# Patient Record
Sex: FEMALE | Race: BLACK OR AFRICAN AMERICAN | NOT HISPANIC OR LATINO | Employment: OTHER | URBAN - METROPOLITAN AREA
[De-identification: names, ages, dates, MRNs, and addresses within clinical notes are randomized per-mention and may not be internally consistent; named-entity substitution may affect disease eponyms.]

---

## 2022-02-08 ENCOUNTER — LAB (OUTPATIENT)
Dept: FAMILY MEDICINE | Facility: CLINIC | Age: 72
End: 2022-02-08

## 2022-02-08 DIAGNOSIS — Z20.822 SUSPECTED COVID-19 VIRUS INFECTION: ICD-10-CM

## 2022-02-08 PROCEDURE — U0005 INFEC AGEN DETEC AMPLI PROBE: HCPCS

## 2022-02-08 PROCEDURE — 99207 PR NO CHARGE LOS: CPT

## 2022-02-08 PROCEDURE — U0003 INFECTIOUS AGENT DETECTION BY NUCLEIC ACID (DNA OR RNA); SEVERE ACUTE RESPIRATORY SYNDROME CORONAVIRUS 2 (SARS-COV-2) (CORONAVIRUS DISEASE [COVID-19]), AMPLIFIED PROBE TECHNIQUE, MAKING USE OF HIGH THROUGHPUT TECHNOLOGIES AS DESCRIBED BY CMS-2020-01-R: HCPCS

## 2022-02-09 LAB — SARS-COV-2 RNA RESP QL NAA+PROBE: NEGATIVE

## 2022-02-10 ENCOUNTER — TELEPHONE (OUTPATIENT)
Dept: FAMILY MEDICINE | Facility: CLINIC | Age: 72
End: 2022-02-10

## 2022-02-10 NOTE — TELEPHONE ENCOUNTER

## 2023-06-02 ENCOUNTER — APPOINTMENT (OUTPATIENT)
Dept: CT IMAGING | Facility: CLINIC | Age: 73
End: 2023-06-02
Attending: EMERGENCY MEDICINE
Payer: COMMERCIAL

## 2023-06-02 ENCOUNTER — APPOINTMENT (OUTPATIENT)
Dept: MRI IMAGING | Facility: CLINIC | Age: 73
End: 2023-06-02
Attending: EMERGENCY MEDICINE
Payer: COMMERCIAL

## 2023-06-02 ENCOUNTER — HOSPITAL ENCOUNTER (EMERGENCY)
Facility: CLINIC | Age: 73
Discharge: HOME | End: 2023-06-02
Attending: EMERGENCY MEDICINE | Admitting: EMERGENCY MEDICINE
Payer: COMMERCIAL

## 2023-06-02 VITALS
BODY MASS INDEX: 26.21 KG/M2 | HEIGHT: 59 IN | RESPIRATION RATE: 12 BRPM | WEIGHT: 130 LBS | TEMPERATURE: 98.6 F | HEART RATE: 54 BPM | OXYGEN SATURATION: 96 % | SYSTOLIC BLOOD PRESSURE: 120 MMHG | DIASTOLIC BLOOD PRESSURE: 64 MMHG

## 2023-06-02 DIAGNOSIS — R42 LIGHTHEADEDNESS: ICD-10-CM

## 2023-06-02 DIAGNOSIS — R42 DIZZINESS: ICD-10-CM

## 2023-06-02 LAB
ANION GAP SERPL CALCULATED.3IONS-SCNC: 9 MMOL/L (ref 5–18)
APTT PPP: 26 SECONDS (ref 22–38)
ATRIAL RATE - MUSE: 80 BPM
BASOPHILS # BLD AUTO: 0 10E3/UL (ref 0–0.2)
BASOPHILS NFR BLD AUTO: 0 %
BUN SERPL-MCNC: 25 MG/DL (ref 8–28)
CALCIUM SERPL-MCNC: 9.9 MG/DL (ref 8.5–10.5)
CHLORIDE BLD-SCNC: 104 MMOL/L (ref 98–107)
CO2 SERPL-SCNC: 26 MMOL/L (ref 22–31)
CREAT SERPL-MCNC: 0.91 MG/DL (ref 0.6–1.1)
DIASTOLIC BLOOD PRESSURE - MUSE: 72 MMHG
EOSINOPHIL # BLD AUTO: 0.1 10E3/UL (ref 0–0.7)
EOSINOPHIL NFR BLD AUTO: 3 %
ERYTHROCYTE [DISTWIDTH] IN BLOOD BY AUTOMATED COUNT: 13.4 % (ref 10–15)
GFR SERPL CREATININE-BSD FRML MDRD: 65 ML/MIN/1.73M2
GLUCOSE BLD-MCNC: 167 MG/DL (ref 70–125)
GLUCOSE BLDC GLUCOMTR-MCNC: 183 MG/DL (ref 70–99)
HCT VFR BLD AUTO: 33 % (ref 35–47)
HGB BLD-MCNC: 11.1 G/DL (ref 11.7–15.7)
IMM GRANULOCYTES # BLD: 0 10E3/UL
IMM GRANULOCYTES NFR BLD: 0 %
INR PPP: 1.06 (ref 0.85–1.15)
INTERPRETATION ECG - MUSE: NORMAL
LYMPHOCYTES # BLD AUTO: 1.8 10E3/UL (ref 0.8–5.3)
LYMPHOCYTES NFR BLD AUTO: 35 %
MCH RBC QN AUTO: 26.2 PG (ref 26.5–33)
MCHC RBC AUTO-ENTMCNC: 33.6 G/DL (ref 31.5–36.5)
MCV RBC AUTO: 78 FL (ref 78–100)
MONOCYTES # BLD AUTO: 0.5 10E3/UL (ref 0–1.3)
MONOCYTES NFR BLD AUTO: 9 %
NEUTROPHILS # BLD AUTO: 2.7 10E3/UL (ref 1.6–8.3)
NEUTROPHILS NFR BLD AUTO: 53 %
NRBC # BLD AUTO: 0 10E3/UL
NRBC BLD AUTO-RTO: 0 /100
P AXIS - MUSE: 30 DEGREES
PLATELET # BLD AUTO: 210 10E3/UL (ref 150–450)
POTASSIUM BLD-SCNC: 3.6 MMOL/L (ref 3.5–5)
PR INTERVAL - MUSE: 188 MS
QRS DURATION - MUSE: 82 MS
QT - MUSE: 382 MS
QTC - MUSE: 440 MS
R AXIS - MUSE: -12 DEGREES
RBC # BLD AUTO: 4.24 10E6/UL (ref 3.8–5.2)
SODIUM SERPL-SCNC: 139 MMOL/L (ref 136–145)
SYSTOLIC BLOOD PRESSURE - MUSE: 153 MMHG
T AXIS - MUSE: 10 DEGREES
TROPONIN I SERPL-MCNC: <0.01 NG/ML (ref 0–0.29)
VENTRICULAR RATE- MUSE: 80 BPM
WBC # BLD AUTO: 5 10E3/UL (ref 4–11)

## 2023-06-02 PROCEDURE — 85025 COMPLETE CBC W/AUTO DIFF WBC: CPT | Performed by: EMERGENCY MEDICINE

## 2023-06-02 PROCEDURE — 85610 PROTHROMBIN TIME: CPT | Performed by: EMERGENCY MEDICINE

## 2023-06-02 PROCEDURE — 70450 CT HEAD/BRAIN W/O DYE: CPT

## 2023-06-02 PROCEDURE — 85730 THROMBOPLASTIN TIME PARTIAL: CPT | Performed by: EMERGENCY MEDICINE

## 2023-06-02 PROCEDURE — 70498 CT ANGIOGRAPHY NECK: CPT

## 2023-06-02 PROCEDURE — 93005 ELECTROCARDIOGRAM TRACING: CPT | Performed by: EMERGENCY MEDICINE

## 2023-06-02 PROCEDURE — 84484 ASSAY OF TROPONIN QUANT: CPT | Performed by: EMERGENCY MEDICINE

## 2023-06-02 PROCEDURE — G1010 CDSM STANSON: HCPCS

## 2023-06-02 PROCEDURE — 82962 GLUCOSE BLOOD TEST: CPT

## 2023-06-02 PROCEDURE — 80048 BASIC METABOLIC PNL TOTAL CA: CPT | Performed by: EMERGENCY MEDICINE

## 2023-06-02 PROCEDURE — A9585 GADOBUTROL INJECTION: HCPCS | Performed by: EMERGENCY MEDICINE

## 2023-06-02 PROCEDURE — 99285 EMERGENCY DEPT VISIT HI MDM: CPT | Mod: 25

## 2023-06-02 PROCEDURE — 250N000011 HC RX IP 250 OP 636: Performed by: EMERGENCY MEDICINE

## 2023-06-02 PROCEDURE — 70496 CT ANGIOGRAPHY HEAD: CPT

## 2023-06-02 PROCEDURE — 70553 MRI BRAIN STEM W/O & W/DYE: CPT | Mod: MG

## 2023-06-02 PROCEDURE — 36415 COLL VENOUS BLD VENIPUNCTURE: CPT | Performed by: EMERGENCY MEDICINE

## 2023-06-02 PROCEDURE — 255N000002 HC RX 255 OP 636: Performed by: EMERGENCY MEDICINE

## 2023-06-02 RX ORDER — GADOBUTROL 604.72 MG/ML
6 INJECTION INTRAVENOUS ONCE
Status: COMPLETED | OUTPATIENT
Start: 2023-06-02 | End: 2023-06-02

## 2023-06-02 RX ORDER — IOPAMIDOL 755 MG/ML
75 INJECTION, SOLUTION INTRAVASCULAR ONCE
Status: COMPLETED | OUTPATIENT
Start: 2023-06-02 | End: 2023-06-02

## 2023-06-02 RX ADMIN — GADOBUTROL 6 ML: 604.72 INJECTION INTRAVENOUS at 21:08

## 2023-06-02 RX ADMIN — IOPAMIDOL 75 ML: 755 INJECTION, SOLUTION INTRAVENOUS at 18:08

## 2023-06-02 ASSESSMENT — ACTIVITIES OF DAILY LIVING (ADL)
ADLS_ACUITY_SCORE: 35
ADLS_ACUITY_SCORE: 35

## 2023-06-02 NOTE — ED TRIAGE NOTES
Pt c/o dizziness, weakness, blurred vision, and tingling that started at about 1700. She says mainly these symptoms were affecting her left side. MD to triage for eval     Triage Assessment     Row Name 06/02/23 1750       Triage Assessment (Adult)    Airway WDL WDL       Respiratory WDL    Respiratory WDL WDL       Skin Circulation/Temperature WDL    Skin Circulation/Temperature WDL WDL       Cardiac WDL    Cardiac WDL WDL       Peripheral/Neurovascular WDL    Peripheral Neurovascular WDL WDL       Cognitive/Neuro/Behavioral WDL    Cognitive/Neuro/Behavioral WDL WDL

## 2023-06-02 NOTE — CONSULTS
[facility]    Stroke Telephone Note    I was called by North Busch on 06/02/23 regarding patient Fozia Brooks.     Fozia Brooks is a 74 YO F w/vascular RFs: HLD not on statin (pt refuses) and PMHx sig for breast ca (DCIS) on letrazole and chornic HCV infection who presents on 6/2 as a tier 1 stroke code for posterior circulation symptoms and HA.     Pt presenting within 1 hour of symptom onset.     Pt endorses HA, dizziness, bilateral blurry vision. She usually does not have HA pain/migraines. Unclear which symptoms occurred first.     In ED exam reported to be non-focal, pt can stand/walk independently.     In ED /85. Initial labs Hgb 11.1.    NCCT: No intracranial process  CTA H/N: No high grade stenosis, dominant L VA with hypoplastic R VA    Given reported non-focal exam with no disabling deficits (pt can stand/walk independently without issues), TNK not considered/offered    Stroke Code Data (for stroke code without tele)  Stroke code activated 06/02/23   1750   Stroke provider first response  06/02/23   1755            Last known normal 06/02/23   1650        Time of discovery   (or onset of symptoms) 06/02/23   1700   Head CT read by Stroke Neuro Dr/Provider 06/02/23   1820   Was stroke code de-escalated? 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 Highest current suspicion currently for stroke mimic - complex migraine/HA as leading etiology. Reassuring neurovascular imaging.   - Toxometabolic workup per ED  - MRI brain w/wo   - Please page stroke neuro on-call following MRI for final plan    My recommendations are based on the information provided over the phone by Fozia Brooks's in-person providers. They are not intended to  "replace the clinical judgment of her in-person providers. I was not requested to personally see or examine the patient at this time.    The Stroke Staff is Dr. Iverson.    Ariel Monroy MD  Vascular Neurology Fellow    To page me or covering stroke neurology team member, click here: AMCOM  Choose \"On Call\" tab at top, then select \"NEUROLOGY/ALL SITES\" from middle drop-down box, press Enter, then look for \"stroke\" or \"telestroke\" for your site.      "

## 2023-06-02 NOTE — ED PROVIDER NOTES
EMERGENCY DEPARTMENT ENCOUnter      NAME: Fozia Brooks  AGE: 73 year old female  YOB: 1950  MRN: [id number]  EVALUATION DATE & TIME: No admission date for patient encounter.    PCP: Shmuel Duong    ED PROVIDER: North Busch DO      Chief Complaint   Patient presents with     Dizziness     Generalized Weakness     Eye Problem         FINAL IMPRESSION:  1. Lightheadedness    2. Dizziness          ED COURSE & MEDICAL DECISION MAKIN:50 PM I met with patient for initial encounter.  6:19 PM I spoke with radiology regarding patient.  10:07 PM I spoke to Dr Lopez with stroke neurology regarding patient.  10:19 PM I updated patient with results.      The patient presented to the emergency department today after having sudden onset of dizziness and lightheadedness.  She was initially made a stroke code.  CT imaging was unremarkable.  Laboratory testing was normal as well.  Her case was discussed with the stroke team.  They recommended obtaining an MRI for further evaluation.  Fortunately, her MRI did not show any acute findings.  Her case was then discussed with the stroke team again.  Given her normal work-up tonight, they feel that the patient can be safely discharged home.  Both the patient and family are comfortable with this plan.  They have been advised to return right away for any worsening symptoms or other concerns.        Medical Decision Making    History:    Supplemental history from: Documented in chart, if applicable    External Record(s) reviewed: Documented in chart, if applicable.    Work Up:    Chart documentation includes differential considered and any EKGs or imaging independently interpreted by provider, where specified.    In additional to work up documented, I considered the following work up: Documented in chart, if applicable.    External consultation:    Discussion of management with another provider: Documented in chart, if applicable    Complicating  "factors:    Care impacted by chronic illness: N/A    Care affected by social determinants of health: N/A    Disposition considerations: Discharge. No recommendations on prescription strength medication(s). I considered admission, but discharged the patient after share decision making conversation.        At the conclusion of the encounter I discussed the results of all of the tests and the disposition. The questions were answered. The patient or family acknowledged understanding and was agreeable with the care plan.         MEDICATIONS GIVEN IN THE EMERGENCY:  Medications   iopamidol (ISOVUE-370) solution 75 mL (75 mLs Intravenous $Given 6/2/23 1808)   gadobutrol (GADAVIST) injection 6 mL (6 mLs Intravenous $Given 6/2/23 2108)       =================================================================    HPI        Fozia Brooks is a 73 year old female with no pertinent history who presents to this ED via walk-in for evaluation of dizziness, light headedness.    Patient states that she was checking out in a grocery store around 5 PM when she experienced a sudden onset of dizziness, light headedness, blurred vision, and bilateral hand tingling. She denies any provoking symptoms. Patient states she currently has bilateral numbness that is worse on the left, and endorses tinnitus. She states that she feels \"wobbly\" and that there is a ringing in her head. Patient denies any history of similar symptoms.    Patient denies all other complaints at this time.      REVIEW OF SYSTEMS     Constitutional:  Denies fever or chills  HENT:  Denies sore throat. Positive for tinnitus  Eyes: Positive for blurred vision  Respiratory:  Denies cough or shortness of breath   Cardiovascular:  Denies chest pain or palpitations  GI:  Denies abdominal pain, nausea, or vomiting  Musculoskeletal:  Denies any new extremity pain   Skin:  Denies rash   Neurologic:  Denies headache. Positive for numbness (worse on left,) light headedness, " "dizziness  All other systems reviewed and are negative      PAST MEDICAL HISTORY:  History reviewed. No pertinent past medical history.    PAST SURGICAL HISTORY:  History reviewed. No pertinent surgical history.        CURRENT MEDICATIONS:    No current outpatient medications on file.      ALLERGIES:  Allergies   Allergen Reactions     Codeine Nausea and Vomiting     Salicylates Rash       FAMILY HISTORY:  History reviewed. No pertinent family history.    SOCIAL HISTORY:   Social History     Socioeconomic History     Marital status: Single     Spouse name: None     Number of children: None     Years of education: None     Highest education level: None       VITALS:  Patient Vitals for the past 24 hrs:   BP Temp Temp src Pulse Resp SpO2 Height Weight   06/02/23 2200 120/64 -- -- 54 12 96 % -- --   06/02/23 2130 (!) 166/72 -- -- 56 13 96 % -- --   06/02/23 2000 (!) 147/80 -- -- 68 22 94 % -- --   06/02/23 1930 (!) 168/90 -- -- 73 20 -- 1.499 m (4' 11\") 59 kg (130 lb)   06/02/23 1914 (!) 176/88 -- -- 76 12 97 % -- --   06/02/23 1845 (!) 151/73 -- -- 81 -- 98 % -- --   06/02/23 1830 (!) 151/78 -- -- 81 16 96 % -- --   06/02/23 1817 (!) 153/72 -- -- 82 -- 96 % -- --   06/02/23 1800 -- -- -- 89 18 98 % -- --   06/02/23 1750 (!) 141/85 98.6  F (37  C) Temporal 85 16 95 % -- --       PHYSICAL EXAM    Constitutional:  Well developed, Well nourished,  HENT:  Normocephalic, Atraumatic, Oropharynx moist, Nose normal.   Eyes:  EOMI, Conjunctiva normal, No discharge.   Respiratory:  Normal breath sounds, No respiratory distress, No wheezing, No chest tenderness.   Cardiovascular:  Normal heart rate, Normal rhythm, No murmurs  GI:  Soft, No tenderness, No guarding, No CVA tenderness.   Musculoskeletal:  No tenderness to palpation or major deformities noted.   Extremities: No lower extremity edema.  Neurologic:  Alert & oriented x 3, No focal deficits noted.   Psychiatric:  Affect normal, Judgment normal, Mood normal.    "     LAB:  All pertinent labs reviewed and interpreted.  Results for orders placed or performed during the hospital encounter of 06/02/23                                    Basic metabolic panel   Result Value Ref Range    Sodium 139 136 - 145 mmol/L    Potassium 3.6 3.5 - 5.0 mmol/L    Chloride 104 98 - 107 mmol/L    Carbon Dioxide (CO2) 26 22 - 31 mmol/L    Anion Gap 9 5 - 18 mmol/L    Urea Nitrogen 25 8 - 28 mg/dL    Creatinine 0.91 0.60 - 1.10 mg/dL    Calcium 9.9 8.5 - 10.5 mg/dL    Glucose 167 (H) 70 - 125 mg/dL    GFR Estimate 65 >60 mL/min/1.73m2   Result Value Ref Range    INR 1.06 0.85 - 1.15   Partial thromboplastin time   Result Value Ref Range    aPTT 26 22 - 38 Seconds   Result Value Ref Range    Troponin I <0.01 0.00 - 0.29 ng/mL   CBC with platelets and differential   Result Value Ref Range    WBC Count 5.0 4.0 - 11.0 10e3/uL    RBC Count 4.24 3.80 - 5.20 10e6/uL    Hemoglobin 11.1 (L) 11.7 - 15.7 g/dL    Hematocrit 33.0 (L) 35.0 - 47.0 %    MCV 78 78 - 100 fL    MCH 26.2 (L) 26.5 - 33.0 pg    MCHC 33.6 31.5 - 36.5 g/dL    RDW 13.4 10.0 - 15.0 %    Platelet Count 210 150 - 450 10e3/uL    % Neutrophils 53 %    % Lymphocytes 35 %    % Monocytes 9 %    % Eosinophils 3 %    % Basophils 0 %    % Immature Granulocytes 0 %    NRBCs per 100 WBC 0 <1 /100    Absolute Neutrophils 2.7 1.6 - 8.3 10e3/uL    Absolute Lymphocytes 1.8 0.8 - 5.3 10e3/uL    Absolute Monocytes 0.5 0.0 - 1.3 10e3/uL    Absolute Eosinophils 0.1 0.0 - 0.7 10e3/uL    Absolute Basophils 0.0 0.0 - 0.2 10e3/uL    Absolute Immature Granulocytes 0.0 <=0.4 10e3/uL    Absolute NRBCs 0.0 10e3/uL   Glucose by meter   Result Value Ref Range    GLUCOSE BY METER POCT 183 (H) 70 - 99 mg/dL         RADIOLOGY:  I have independently reviewed and interpreted the above imaging, pending the final radiology read.  MR Brain w/o & w Contrast   Final Result   IMPRESSION:   1.  No acute intracranial process.   2.  Mild microvascular ischemic changes as detailed  above.      CTA Head Neck with Contrast   Final Result   IMPRESSION:    HEAD CTA:   No significant stenosis, aneurysm, or high flow vascular malformation identified.      NECK CTA:   1.  No hemodynamically significant stenosis in the neck vessels by NASCET criteria.   2.  No evidence for dissection.   3.  Heterogeneously enhancing 3.5 x 2.5 cm nodule involving the thyroid isthmus. Follow-up thyroid ultrasound for further characterization is advised if not previously evaluated.      Preliminary findings discussed with Dr. Busch at 1818 hours 06/02/2023.         CT Head w/o Contrast   Final Result   IMPRESSION:   1.  No CT evidence for acute intracranial process.    2.  Mild brain atrophy and presumed chronic microvascular ischemic changes as above.             EKG:    Normal sinus rhythm at 80 bpm.  No signs of acute ischemia.  QRS 82 ms, QTc 440 ms.    I have independently reviewed and interpreted this EKG          I, Eligio Collier, am serving as a scribe to document services personally performed by Dr. Busch based on my observation and the provider's statements to me. I, North Busch, DO attest that Eligio Collier is acting in a scribe capacity, has observed my performance of the services and has documented them in accordance with my direction.    North Busch, DO  Emergency Medicine  [facility] EMERGENCY ROOM  [address] [address] [phone]  Dept: [phone]      North Busch MD  06/03/23 0032

## 2023-06-03 NOTE — ED NOTES
Went over the discharge paperwork with the patient and daughter, answered questions and concerns to the best of my ability.   No neurocognitive abnormalities noted. Gait is steady, alert and oriented x 4, speech is clear, denies dizziness, no sensory impairments.

## 2023-06-03 NOTE — ED NOTES
"Patient up with a one assist to the bathroom, appears steady and denies dizziness or feeling \"wobbly\" like earlier however states \"I feel off\".     "

## 2023-06-03 NOTE — ED NOTES
"AIDET performed, white board updated for rounding. Patient updated on plan of care. Patient's pain assessed. Call light within reach, bed in low position, side rails up. Visitor at bedside: family members      Patient reports feeling \"back to normal\". Does not report any abnormalities at this time including weakness, dizziness, headache, visual disturbances, or decreased sensation on left side like earlier today. Patient is alert and oriented x 4, speech is clear, no sensory impairments or pain  "

## 2024-01-21 ENCOUNTER — HOSPITAL ENCOUNTER (EMERGENCY)
Facility: CLINIC | Age: 74
Discharge: HOME | End: 2024-01-21
Admitting: PHYSICIAN ASSISTANT
Payer: COMMERCIAL

## 2024-01-21 VITALS
HEART RATE: 59 BPM | DIASTOLIC BLOOD PRESSURE: 79 MMHG | HEIGHT: 59 IN | RESPIRATION RATE: 16 BRPM | OXYGEN SATURATION: 97 % | WEIGHT: 130 LBS | BODY MASS INDEX: 26.21 KG/M2 | SYSTOLIC BLOOD PRESSURE: 153 MMHG | TEMPERATURE: 98.2 F

## 2024-01-21 DIAGNOSIS — H57.89 REDNESS OF LEFT EYE: ICD-10-CM

## 2024-01-21 PROBLEM — R41.89 COGNITIVE IMPAIRMENT: Status: ACTIVE | Noted: 2022-11-30

## 2024-01-21 PROCEDURE — 250N000009 HC RX 250: Performed by: PHYSICIAN ASSISTANT

## 2024-01-21 PROCEDURE — 99283 EMERGENCY DEPT VISIT LOW MDM: CPT

## 2024-01-21 RX ORDER — ERYTHROMYCIN 5 MG/G
0.5 OINTMENT OPHTHALMIC 4 TIMES DAILY
Qty: 3.5 G | Refills: 0 | Status: SHIPPED | OUTPATIENT
Start: 2024-01-21

## 2024-01-21 RX ORDER — TETRACAINE HYDROCHLORIDE 5 MG/ML
1 SOLUTION OPHTHALMIC ONCE
Status: COMPLETED | OUTPATIENT
Start: 2024-01-21 | End: 2024-01-21

## 2024-01-21 RX ADMIN — TETRACAINE HYDROCHLORIDE 1 DROP: 5 SOLUTION OPHTHALMIC at 14:48

## 2024-01-21 RX ADMIN — FLUORESCEIN SODIUM 1 STRIP: 1 STRIP OPHTHALMIC at 14:48

## 2024-01-21 ASSESSMENT — VISUAL ACUITY: OU: 1

## 2024-01-21 ASSESSMENT — ACTIVITIES OF DAILY LIVING (ADL): ADLS_ACUITY_SCORE: 35

## 2024-01-21 ASSESSMENT — TONOMETRY
IOP_HANDHELD: 1
OS_IOP_MMHG: 16

## 2024-01-21 NOTE — DISCHARGE INSTRUCTIONS
You were seen here in the emergency department for evaluation of eye redness and irritation.  I have prescribed some antibiotics, send these to the pharmacy.  Please call the number for sample I included above, and schedule a follow-up appointment.  This may be early infection versus irritation.  Your pressures in your eye here are normal.  Please follow-up with an eye doctor.

## 2024-01-21 NOTE — ED PROVIDER NOTES
EMERGENCY DEPARTMENT ENCOUNTER      NAME: Fozia Brooks  AGE: 73 year old female  YOB: 1950  MRN: [id number]  EVALUATION DATE & TIME: 1/21/2024  2:04 PM    PCP: Shmuel Duong    ED PROVIDER: Ken Lewis PA-C      Chief Complaint   Patient presents with    Eye Problem     FINAL IMPRESSION:  1. Redness of left eye      ED COURSE & MEDICAL DECISION MAKING:    Pertinent Labs & Imaging studies reviewed. (See chart for details)  2:05 PM I met the patient and performed my initial interview and exam.   2:51 PM Schwab lamp performed, plan for discharge.     73 year old female presents to the Emergency Department for evaluation of left eye redness.     ED Course as of 01/21/24 1454   Sun Jan 21, 2024   1414 Is a 73-year-old female, presents emergency department for evaluation of left-sided eye irritation, redness.  Patient appears to have a hyphema to the left side.  Reports that she recently started sleeping with her dog in the room, has had the dog for 3 years, however recently brought the dog in the room.  Denies any recent trauma or injury to the eye.  No pain, tenderness, or pain with movement of the eye.  On examination here, she does have some significant erythema, as well as some injection along the medial and lateral portion of the eye, concern for possible corneal abrasion.  Certainly does not seem consistent with laceration.  No recent trauma to suggest orbital or globe rupture.  She otherwise has no acute complaints here, does not have any vision losses, changes in vision, or blurry vision.  No fever cough cold or chills.  Otherwise no acute symptoms here.  Fluorescein staining here to rule out corneal abrasion or ulceration, as well as to obtain eye pressures here.   1453 Examination performed here in the emergency department, diffuse fluorescein uptake, no pain with eye movements, or foreign body appreciated.  Certainly does not seem consistent with hyphema at this point, redness  is currently isolated to the sclera, however is painless, which would present more like a hyphema.  Pressures here are normal.  Patient without any other significant abnormalities.  Will have her follow-up with ophthalmology tomorrow.  Will give some erythromycin ointment to prevent any further infection given the diffuse irritation in the left eye.  She otherwise has no acute complaints, no changes in vision, eye pressures are normal.  No concern for acute angle-closure glaucoma, or other more significant injury such as globe rupture.       Medical Decision Making  Obtained supplemental history:Supplemental history obtained?: No  Reviewed external records: External records reviewed?: Outpatient Record: Patient emergency department visit on 06/02/2023, outpatient family medicine office visit from 06/21/2023, outpatient office visit from 09/07/2023  Care impacted by chronic illness:Hyperlipidemia and Hypertension  Care significantly affected by social determinants of health:N/A  Did you consider but not order tests?: Work up considered but not performed and documented in chart, if applicable  Did you interpret images independently?: Independent interpretation of ECG and images noted in documentation, when applicable.  Consultation discussion with other provider:Did you involve another provider (consultant, , pharmacy, etc.)?: No  Discharge. I prescribed additional prescription strength medication(s) as charted. N/A.         At the conclusion of the encounter I discussed the results of all of the tests and the disposition. The questions were answered. The patient or family acknowledged understanding and was agreeable with the care plan.       0 minutes of critical care time     MEDICATIONS GIVEN IN THE EMERGENCY:  Medications   fluorescein (FUL-NAVEED) ophthalmic strip 1 strip (1 strip Left Eye $Given 1/21/24 1448)   tetracaine (PONTOCAINE) 0.5 % ophthalmic solution 1 drop (1 drop Left Eye $Given 1/21/24 1448)  "      NEW PRESCRIPTIONS STARTED AT TODAY'S ER VISIT  New Prescriptions    ERYTHROMYCIN (ROMYCIN) 5 MG/GM OPHTHALMIC OINTMENT    Place 0.5 inches Into the left eye 4 times daily          =================================================================    HPI    Patient information was obtained from: Patient     Use of : N/A         Fozia Brooks is a 73 year old female with a pertinent history of hypertension, cognitive impairment who presents to this ED for evaluation of left eye redness.  Patient reports beginning 3 days ago, she noticed increasing left eye redness.  She denies any pain, or changes in vision.  She has not washing out her eye.  Denies any recent trauma, falls or injury.  She is aware that she is allergic to her dog.  No cough cold or chills.  No other acute symptoms.  No visual field deficits.  No other pain.    PAST MEDICAL HISTORY:  No past medical history on file.    PAST SURGICAL HISTORY:  No past surgical history on file.        CURRENT MEDICATIONS:    erythromycin (ROMYCIN) 5 MG/GM ophthalmic ointment         ALLERGIES:  Allergies   Allergen Reactions    Aspirin Rash    Ibuprofen GI Disturbance    Morphine Rash    Codeine Nausea and Vomiting    Salicylates Rash       FAMILY HISTORY:  No family history on file.    SOCIAL HISTORY:   Social History     Socioeconomic History    Marital status:        VITALS:  BP (!) 153/79   Pulse 59   Temp 98.2  F (36.8  C) (Temporal)   Resp 16   Ht 1.499 m (4' 11\")   Wt 59 kg (130 lb)   SpO2 97%   BMI 26.26 kg/m      PHYSICAL EXAM    Physical Exam  Vitals and nursing note reviewed.   Constitutional:       General: She is not in acute distress.     Appearance: Normal appearance. She is normal weight. She is not toxic-appearing or diaphoretic.   HENT:      Right Ear: External ear normal.      Left Ear: External ear normal.   Eyes:      General: Lids are normal. Lids are everted, no foreign bodies appreciated. Vision grossly intact.   "       Left eye: No foreign body or discharge.      Intraocular pressure: Left eye pressure is 16 mmHg. Measurements were taken using a handheld tonometer.     Conjunctiva/sclera: Conjunctivae normal.      Pupils:      Left eye: No corneal abrasion. Kings exam negative.     Visual Fields: Right eye visual fields normal and left eye visual fields normal.      Comments: Diffuse fluorescein uptake in the left eye, secondary to some irritation. There is surrounding redness, rotation within the sclera, however no evidence of hyphema, or blood within the pupil itself.  No recent trauma.  No pain with eye movements. no bulging, pupils equal and reactive.   Cardiovascular:      Rate and Rhythm: Normal rate and regular rhythm.      Pulses: Normal pulses.   Pulmonary:      Effort: Pulmonary effort is normal.   Abdominal:      General: Abdomen is flat.      Palpations: Abdomen is soft.   Skin:     Findings: No erythema or rash.   Neurological:      Mental Status: She is alert. Mental status is at baseline.           LAB:  All pertinent labs reviewed and interpreted.  Labs Ordered and Resulted from Time of ED Arrival to Time of ED Departure - No data to display    RADIOLOGY:  Reviewed all pertinent imaging. Please see official radiology report.  No orders to display     PROCEDURES:     PROCEDURE: Woods lamp Exam   INDICATIONS: Left eye redness   PROCEDURE PROVIDER: Carlos Lewis PA-C   SITE: left eye   CONSENT:  The risks, benefits and alternatives for this procedure were explained to the patient and verbally accepted.     MEDICATION: fluorescein stain and proparacaine   EXAM FINDINGS: Right Eye: N/A  Left Eye: Left eye redness, mild irritation, no Kings sign, no significant corneal abrasion or ulceration   COMPLICATIONS: Patient tolerated procedure well, without complication       Ken Lewis PA-C  Emergency Medicine  [facility] EMERGENCY ROOM  [address]  [facility] [address]  [phone]  Dept: [phone]       Ken Lewis PA-C  01/21/24 1457

## 2024-01-21 NOTE — ED TRIAGE NOTES
Pt presents with hyphema to left eye beginning 3 days ago. Denies any pain or changes in vision. Reports using salves and washing out her eye. Denies any trauma. Pt is concerned she is allergic to her dog.

## 2024-01-21 NOTE — ED NOTES
Discharge paperwork discussed with pt. Questions were answered to patient satisfaction. Vita Dominguez RN 1/21/2024 2:57 PM